# Patient Record
Sex: MALE | Race: WHITE | NOT HISPANIC OR LATINO | Employment: STUDENT | RURAL
[De-identification: names, ages, dates, MRNs, and addresses within clinical notes are randomized per-mention and may not be internally consistent; named-entity substitution may affect disease eponyms.]

---
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
## 2022-01-13 ENCOUNTER — OFFICE VISIT (OUTPATIENT)
Dept: FAMILY MEDICINE | Facility: CLINIC | Age: 22
End: 2022-01-13
Payer: MEDICAID

## 2022-01-13 VITALS
HEART RATE: 68 BPM | SYSTOLIC BLOOD PRESSURE: 110 MMHG | TEMPERATURE: 98 F | RESPIRATION RATE: 18 BRPM | DIASTOLIC BLOOD PRESSURE: 60 MMHG | OXYGEN SATURATION: 98 %

## 2022-01-13 DIAGNOSIS — Z20.822 CONTACT WITH AND (SUSPECTED) EXPOSURE TO COVID-19: Primary | ICD-10-CM

## 2022-01-13 PROCEDURE — 1159F PR MEDICATION LIST DOCUMENTED IN MEDICAL RECORD: ICD-10-PCS | Mod: CPTII,,, | Performed by: NURSE PRACTITIONER

## 2022-01-13 PROCEDURE — 3078F DIAST BP <80 MM HG: CPT | Mod: CPTII,,, | Performed by: NURSE PRACTITIONER

## 2022-01-13 PROCEDURE — 3074F SYST BP LT 130 MM HG: CPT | Mod: CPTII,,, | Performed by: NURSE PRACTITIONER

## 2022-01-13 PROCEDURE — 3078F PR MOST RECENT DIASTOLIC BLOOD PRESSURE < 80 MM HG: ICD-10-PCS | Mod: CPTII,,, | Performed by: NURSE PRACTITIONER

## 2022-01-13 PROCEDURE — 1160F PR REVIEW ALL MEDS BY PRESCRIBER/CLIN PHARMACIST DOCUMENTED: ICD-10-PCS | Mod: CPTII,,, | Performed by: NURSE PRACTITIONER

## 2022-01-13 PROCEDURE — 99212 PR OFFICE/OUTPT VISIT, EST, LEVL II, 10-19 MIN: ICD-10-PCS | Mod: ,,, | Performed by: NURSE PRACTITIONER

## 2022-01-13 PROCEDURE — 3074F PR MOST RECENT SYSTOLIC BLOOD PRESSURE < 130 MM HG: ICD-10-PCS | Mod: CPTII,,, | Performed by: NURSE PRACTITIONER

## 2022-01-13 PROCEDURE — 1159F MED LIST DOCD IN RCRD: CPT | Mod: CPTII,,, | Performed by: NURSE PRACTITIONER

## 2022-01-13 PROCEDURE — 99212 OFFICE O/P EST SF 10 MIN: CPT | Mod: ,,, | Performed by: NURSE PRACTITIONER

## 2022-01-13 PROCEDURE — 1160F RVW MEDS BY RX/DR IN RCRD: CPT | Mod: CPTII,,, | Performed by: NURSE PRACTITIONER

## 2022-01-13 NOTE — PROGRESS NOTES
2022     CHARLEE Santos   [facility]  [address]  [place] [address]     PATIENT NAME: Darion Chi  : 2000  DATE: 22  MRN: [id number]      Billing Provider: CHARLEE Santos  Level of Service:   Patient PCP Information     Provider PCP Type    Primary Doctor No General          Reason for Visit / Chief Complaint: COVID-19 Concerns (Patient exposed to COVID 19, complained of body aches and sinus issues)       Update PCP  Update Chief Complaint         History of Present Illness / Problem Focused Workflow     Darion Chi presents to the clinic having body aches cough sore throat headache  For 2 days -here with 3 other people symptomatic and positive covid      Review of Systems     Review of Systems   Constitutional: Positive for chills, fatigue and fever.   HENT: Positive for nasal congestion, postnasal drip and sore throat. Negative for ear pain, trouble swallowing and voice change.    Eyes: Positive for redness.   Respiratory: Positive for cough. Negative for chest tightness, shortness of breath and wheezing.    Cardiovascular: Negative for chest pain and palpitations.   Gastrointestinal: Negative for abdominal pain, change in bowel habit, diarrhea, nausea, vomiting and change in bowel habit.   Genitourinary: Negative for difficulty urinating.   Musculoskeletal: Positive for myalgias.   Integumentary:  Negative for pallor, rash and wound.   Neurological: Positive for weakness and headaches.   Hematological: Negative for adenopathy.   Psychiatric/Behavioral: Negative for confusion.        Medical / Social / Family History     Past Medical History:   Diagnosis Date    ADHD (attention deficit hyperactivity disorder)        Past Surgical History:   Procedure Laterality Date    SHOULDER SURGERY         Social History    reports that he has never smoked. He has never used smokeless tobacco. He reports that he does not drink alcohol and does not use  drugs.    Family History  MrAmandeep's family history is not on file.    Medications and Allergies     Medications  No outpatient medications have been marked as taking for the 1/13/22 encounter (Office Visit) with CHARLEE Santos.       Allergies  Review of patient's allergies indicates:  No Known Allergies    Physical Examination     Vitals:    01/13/22 1103   BP: 110/60   BP Location: Left arm   Patient Position: Sitting   Pulse: 68   Resp: 18   Temp: 98.3 °F (36.8 °C)   TempSrc: Oral   SpO2: 98%      Physical Exam  Vitals and nursing note reviewed.   Constitutional:       General: He is not in acute distress.     Appearance: Normal appearance. He is not ill-appearing or toxic-appearing.   HENT:      Head: Normocephalic.      Right Ear: External ear normal.      Left Ear: External ear normal.      Nose: Rhinorrhea present.      Mouth/Throat:      Mouth: Mucous membranes are moist.      Pharynx: No oropharyngeal exudate or posterior oropharyngeal erythema.   Eyes:      Pupils: Pupils are equal, round, and reactive to light.      Comments: injected   Cardiovascular:      Rate and Rhythm: Normal rate and regular rhythm.      Heart sounds: Normal heart sounds.   Pulmonary:      Effort: Pulmonary effort is normal.      Breath sounds: Normal breath sounds.      Comments: Dry nonproductive cough  Musculoskeletal:      Cervical back: Normal range of motion and neck supple. No rigidity.   Lymphadenopathy:      Cervical: No cervical adenopathy.   Skin:     General: Skin is warm and dry.      Capillary Refill: Capillary refill takes less than 2 seconds.   Neurological:      Mental Status: He is alert and oriented to person, place, and time.   Psychiatric:         Behavior: Behavior normal.          Assessment and Plan (including Health Maintenance)      Problem List  Smart Sets  Document Outside HM   :    Plan:   Covid Medication List      Tylenol 1000 mg three times daily as needed    Vitamin D 5,000 units twice a  day     Zinc 50 mg twice a day     Pepcid 20 mg once a day     Zyrtec 10 mg once a day     Aspirin 325 mg once a day     Vitamin C 1000 mg twice a day     Melatonin 3 mg at bedtime     Increase fluid intake and rest!      Stay home until:    At least 5 days have passed since your symptoms began (or since your positive test, if you have no symptoms),    AND you have no more symptoms, or your symptoms are improving and you have no fever and do not need fever-reducing medication such as Tylenol (acetaminophen) or Advil (ibuprofen).    Remain home after 5 days as long as you have a fever. Remaining at home is important to prevent transmitting infection to others.      The [place] Department of Health ([facility]) recommends the following after  exposure to a COVID-19 infected person:  Any exposed individual who develops symptoms should be tested and stay home.  If you have had a booster shot OR you have been fully vaccinated but are not yet eligible for  a booster because of timing*, you should:  ? Wear a mask around others for 10 days.  ? Test on day 5, if possible.  ? If you develop symptoms get a test and stay home.  *Applies to individuals who completed the primary series of Pfizer or Moderna vaccine within the last 6  months OR completed the primary series of J&J vaccine within the last 2 months  If you are unvaccinated OR are currently eligible for a booster dose but have not yet received  one* you should:  ? Stay home for 5 days. After that continue to wear a mask around others for 5 additional  days.  ? If you cant quarantine you must wear a mask for 10 days.  ? Test on day 5 if possible.  ? If you develop symptoms get a test and stay home  *Applies to individuals who completed the primary series of Pfizer or Moderna vaccine over 6 months  ago and are not boosted OR completed the primary series of J&J over 2 months ago and are not  boosted  Additional considerations:  ? Please let your provider know you  have been exposed if you do go in for testing.  ? If you live in a household with a person who is a confirmed case of COVID-19, your last  exposure is when is when you last had contact less than 6 feet for 15 minutes or more.  ? During the 5 day quarantine at home you may be allowed to continue to work if your employer  says you are essential, and you continue to have no symptoms, undergo symptom and  temperature monitoring by your facility and wear a mask while you are at work and around  others. Contact your employer for approval.  o If you do return to work, you should continue to self-quarantine at home at all other  times.  Official CDC Update on Isolation and Quarantine can be found at  https://www.cdc.gov/media/releases/2021/s1227-isolation-quarantine-guidance.html      Health Maintenance Due   Topic Date Due    Hepatitis C Screening  Never done    Lipid Panel  Never done    COVID-19 Vaccine (1) Never done    HPV Vaccines (1 - Male 2-dose series) Never done    HIV Screening  Never done    TETANUS VACCINE  Never done    Influenza Vaccine (1) Never done       Problem List Items Addressed This Visit    None     Visit Diagnoses     Contact with and (suspected) exposure to covid-19    -  Primary        Contact with and (suspected) exposure to covid-19       The patient has no Health Maintenance topics of status Not Due    Procedures     No future appointments.     No follow-ups on file.     Signature:  CHARLEE Santos    Date of encounter: 1/13/22

## 2022-01-13 NOTE — LETTER
January 13, 2022      [facility] [address] [place] [address]  Phone: [phone]  Fax: [phone]       Patient: Darion Chi   YOB: 2000  Date of Visit: 01/13/2022    To Whom It May Concern:    Norris Chi  was at [facility] on 01/13/2022. The patient may return to work/school on 1/18/2022 if fever/symptom free x24 hours, will need to continue wearing mask for 5 more days after returning.  If you have any questions or concerns, or if I can be of further assistance, please do not hesitate to contact me. Excused starting 1/11/2022    Sincerely,    Mary RESENDIZP; Bernadette Wolf RN

## 2022-02-22 ENCOUNTER — HOSPITAL ENCOUNTER (OUTPATIENT)
Dept: RADIOLOGY | Facility: HOSPITAL | Age: 22
Discharge: HOME | End: 2022-02-22
Attending: ORTHOPAEDIC SURGERY
Payer: MEDICAID

## 2022-02-22 ENCOUNTER — OFFICE VISIT (OUTPATIENT)
Dept: ORTHOPEDICS | Facility: CLINIC | Age: 22
End: 2022-02-22
Payer: MEDICAID

## 2022-02-22 DIAGNOSIS — M25.512 LEFT SHOULDER PAIN: ICD-10-CM

## 2022-02-22 DIAGNOSIS — M25.512 LEFT SHOULDER PAIN: Primary | ICD-10-CM

## 2022-02-22 PROCEDURE — 99212 PR OFFICE/OUTPT VISIT, EST, LEVL II, 10-19 MIN: ICD-10-PCS | Mod: ,,, | Performed by: ORTHOPAEDIC SURGERY

## 2022-02-22 PROCEDURE — 99212 OFFICE O/P EST SF 10 MIN: CPT | Mod: ,,, | Performed by: ORTHOPAEDIC SURGERY

## 2022-02-22 RX ORDER — NAPROXEN 500 MG/1
500 TABLET ORAL 2 TIMES DAILY WITH MEALS
Qty: 60 TABLET | Refills: 3 | Status: SHIPPED | OUTPATIENT
Start: 2022-02-22

## 2022-02-22 NOTE — PROGRESS NOTES
ASSESSMENT:      ICD-10-CM ICD-9-CM   1. Left shoulder pain  M25.512 719.41       PLAN:     -Findings and treatment options were discussed with the patient  -All questions answered  His shoulder feels stable at this point.  This is certainly something that I believe is related to his mild scapular dyskinesis and recent history of overuse of the left shoulder neck.  I prescribed anti-inflammatories.  I will see him back on an as-needed basis.    There are no Patient Instructions on file for this visit.    IMAGING:  No results found.    No new imaging          CC:  Shoulder pain    21 y.o. Male returns to clinic for a follow up visit regarding shoulder pain.      Pt states for the last three days he has had recurrent shoulder pain.         REVIEW OF SYSTEMS:   Constitution: Negative. Negative for chills, fever and night sweats.    Hematologic/Lymphatic: Negative for bleeding problem. Does not bruise/bleed easily.   Skin: Negative for dry skin, itching and rash.   Musculoskeletal: Negative for falls. Positive for shoulder pain and muscle weakness.   All other review of symptoms were reviewed and found to be noncontributory.     PAST MEDICAL HISTORY:   Past Medical History:   Diagnosis Date    ADHD (attention deficit hyperactivity disorder)        PAST SURGICAL HISTORY:   Past Surgical History:   Procedure Laterality Date    SHOULDER SURGERY         FAMILY HISTORY:   No family history on file.    SOCIAL HISTORY:   Social History     Socioeconomic History    Marital status: Single   Tobacco Use    Smoking status: Never Smoker    Smokeless tobacco: Never Used   Substance and Sexual Activity    Alcohol use: Never    Drug use: Never    Sexual activity: Yes       MEDICATIONS:     Current Outpatient Medications:     cetirizine (ZYRTEC) 10 MG tablet, , Disp: , Rfl:     dextroamphetamine-amphetamine (ADDERALL XR) 20 MG 24 hr capsule, Take 20 mg by mouth., Disp: , Rfl:     dextroamphetamine-amphetamine (ADDERALL) 20  mg tablet, , Disp: , Rfl:     fluticasone propionate (FLONASE) 50 mcg/actuation nasal spray, , Disp: , Rfl:     montelukast (SINGULAIR) 10 mg tablet, , Disp: , Rfl:     omeprazole (PRILOSEC) 20 MG capsule, , Disp: , Rfl:     ALLERGIES:   Review of patient's allergies indicates:  No Known Allergies     PHYSICAL EXAMINATION:  There were no vitals taken for this visit.  Ortho/SPM Exam      No evidence of instability on exam today.  He does have point tenderness over the posterior superior border of the scapular.  He has mild pain with range of motion of the cervical spine particularly with flexion and side-to-side rotation      Procedures    Orders Placed This Encounter   Procedures    X-Ray Shoulder 2 or More Views Left

## 2022-10-04 ENCOUNTER — TELEPHONE (OUTPATIENT)
Dept: ORTHOPEDICS | Facility: CLINIC | Age: 22
End: 2022-10-04
Payer: MEDICAID
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
